# Patient Record
Sex: FEMALE | Race: BLACK OR AFRICAN AMERICAN | HISPANIC OR LATINO
[De-identification: names, ages, dates, MRNs, and addresses within clinical notes are randomized per-mention and may not be internally consistent; named-entity substitution may affect disease eponyms.]

---

## 2021-01-25 PROBLEM — Z00.00 ENCOUNTER FOR PREVENTIVE HEALTH EXAMINATION: Status: ACTIVE | Noted: 2021-01-25

## 2021-02-09 ENCOUNTER — APPOINTMENT (OUTPATIENT)
Dept: NEUROLOGY | Facility: CLINIC | Age: 76
End: 2021-02-09

## 2021-04-06 ENCOUNTER — APPOINTMENT (OUTPATIENT)
Dept: NEUROLOGY | Facility: CLINIC | Age: 76
End: 2021-04-06
Payer: MEDICARE

## 2021-04-06 VITALS
BODY MASS INDEX: 17.72 KG/M2 | HEIGHT: 63 IN | HEART RATE: 77 BPM | SYSTOLIC BLOOD PRESSURE: 122 MMHG | WEIGHT: 100 LBS | DIASTOLIC BLOOD PRESSURE: 74 MMHG | OXYGEN SATURATION: 98 % | TEMPERATURE: 97.9 F

## 2021-04-06 DIAGNOSIS — R73.03 PREDIABETES.: ICD-10-CM

## 2021-04-06 DIAGNOSIS — Z82.49 FAMILY HISTORY OF ISCHEMIC HEART DISEASE AND OTHER DISEASES OF THE CIRCULATORY SYSTEM: ICD-10-CM

## 2021-04-06 DIAGNOSIS — Z78.9 OTHER SPECIFIED HEALTH STATUS: ICD-10-CM

## 2021-04-06 DIAGNOSIS — Z86.73 PERSONAL HISTORY OF TRANSIENT ISCHEMIC ATTACK (TIA), AND CEREBRAL INFARCTION W/OUT RESIDUAL DEFICITS: ICD-10-CM

## 2021-04-06 DIAGNOSIS — Z83.3 FAMILY HISTORY OF DIABETES MELLITUS: ICD-10-CM

## 2021-04-06 DIAGNOSIS — E78.5 HYPERLIPIDEMIA, UNSPECIFIED: ICD-10-CM

## 2021-04-06 DIAGNOSIS — I10 ESSENTIAL (PRIMARY) HYPERTENSION: ICD-10-CM

## 2021-04-06 PROCEDURE — 99205 OFFICE O/P NEW HI 60 MIN: CPT

## 2021-04-06 RX ORDER — NIFEDIPINE 90 MG
90 TABLET, EXTENDED RELEASE ORAL
Refills: 0 | Status: ACTIVE | COMMUNITY

## 2021-04-06 RX ORDER — ATENOLOL 25 MG/1
25 TABLET ORAL
Refills: 0 | Status: ACTIVE | COMMUNITY

## 2021-04-06 RX ORDER — PSYLLIUM HUSK 3.4 G/5.8G
58.12 POWDER ORAL
Refills: 0 | Status: ACTIVE | COMMUNITY

## 2021-04-06 RX ORDER — APIXABAN 5 MG/1
5 TABLET, FILM COATED ORAL
Refills: 0 | Status: ACTIVE | COMMUNITY

## 2021-04-06 NOTE — ASSESSMENT
[FreeTextEntry1] : The patient has had a stroke, possibly hemorrhagic but on AC, prior records not available\par daughter will request and bring hospitalization records\par will get CTH and LDL/Hba1C\par will cw statin at this time\par patient on AC\par \par Anxiety \par will refer to psych

## 2021-04-06 NOTE — PHYSICAL EXAM
[General Appearance - Alert] : alert [General Appearance - In No Acute Distress] : in no acute distress [Person] : oriented to person [Place] : oriented to place [Time] : oriented to time [Concentration Intact] : normal concentrating ability [Naming Objects] : no difficulty naming common objects [Fluency] : fluency intact [Comprehension] : comprehension intact [Vocabulary] : adequate range of vocabulary [Cranial Nerves Optic (II)] : visual acuity intact bilaterally,  visual fields full to confrontation, pupils equal round and reactive to light [Cranial Nerves Oculomotor (III)] : extraocular motion intact [Cranial Nerves Trigeminal (V)] : facial sensation intact symmetrically [Cranial Nerves Facial Central - Left Only] : central 7th nerve weakness [Sensation Tactile Decrease] : light touch was intact [1+] : Ankle jerk right 1+ [2+] : Ankle jerk left 2+ [Cranial Nerves Facial Central - Right Only] : no central 7th nerve weakness [Coordination - Dysmetria Impaired Finger-to-Nose Bilateral] : not present [Coordination - Dysmetria Impaired Heel-to-Shin Bilateral] : not present [FreeTextEntry6] : 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

## 2021-04-06 NOTE — CONSULT LETTER
[Dear  ___] : Dear  [unfilled], [Consult Letter:] : I had the pleasure of evaluating your patient, [unfilled]. [Please see my note below.] : Please see my note below. [Consult Closing:] : Thank you very much for allowing me to participate in the care of this patient.  If you have any questions, please do not hesitate to contact me. [Sincerely,] : Sincerely, [FreeTextEntry3] : Michelle Schneider MD, MPH\par

## 2021-04-06 NOTE — HISTORY OF PRESENT ILLNESS
[FreeTextEntry1] : The patient is here for stroke fu.  She had a stroke summer 2020, started with dizziness and then had left sided weakness.  Has had bleed in the brain as per the daughter.  Has had trouble with memory, mood and attention since then.\par \par Was in [facility] and then transferred to rehab.

## 2021-04-07 LAB
CHOLEST SERPL-MCNC: 135 MG/DL
ESTIMATED AVERAGE GLUCOSE: 105 MG/DL
HBA1C MFR BLD HPLC: 5.3 %
HDLC SERPL-MCNC: 69 MG/DL
LDLC SERPL CALC-MCNC: 56 MG/DL
NONHDLC SERPL-MCNC: 66 MG/DL
TRIGL SERPL-MCNC: 49 MG/DL

## 2021-06-07 ENCOUNTER — APPOINTMENT (OUTPATIENT)
Dept: NEUROLOGY | Facility: CLINIC | Age: 76
End: 2021-06-07

## 2021-06-16 ENCOUNTER — APPOINTMENT (OUTPATIENT)
Dept: NEUROLOGY | Facility: CLINIC | Age: 76
End: 2021-06-16

## 2021-07-27 ENCOUNTER — APPOINTMENT (OUTPATIENT)
Dept: NEUROLOGY | Facility: CLINIC | Age: 76
End: 2021-07-27
Payer: MEDICARE

## 2021-07-27 VITALS
DIASTOLIC BLOOD PRESSURE: 71 MMHG | WEIGHT: 99 LBS | BODY MASS INDEX: 17.54 KG/M2 | OXYGEN SATURATION: 99 % | SYSTOLIC BLOOD PRESSURE: 117 MMHG | HEIGHT: 63 IN | HEART RATE: 60 BPM | TEMPERATURE: 98.4 F

## 2021-07-27 DIAGNOSIS — F01.50 VASCULAR DEMENTIA W/OUT BEHAVIORAL DISTURBANCE: ICD-10-CM

## 2021-07-27 DIAGNOSIS — F41.9 ANXIETY DISORDER, UNSPECIFIED: ICD-10-CM

## 2021-07-27 DIAGNOSIS — I63.9 CEREBRAL INFARCTION, UNSPECIFIED: ICD-10-CM

## 2021-07-27 PROCEDURE — 99214 OFFICE O/P EST MOD 30 MIN: CPT

## 2021-07-27 RX ORDER — DONEPEZIL HYDROCHLORIDE 5 MG/1
5 TABLET ORAL
Qty: 60 | Refills: 5 | Status: ACTIVE | COMMUNITY
Start: 2021-07-27 | End: 1900-01-01

## 2021-07-27 NOTE — HISTORY OF PRESENT ILLNESS
[FreeTextEntry1] : The patient is here for stroke fu. She had a stroke summer 2020, started with dizziness and then had left sided weakness. Has had bleed in the brain as per the daughter. Has had trouble with memory, mood and attention since then.\par \par Was in [facility] and then transferred to rehab. \par Since last visit, the patient had another episode of left facial weakness with slurred speech which lasted for a few minutes, the patient was seen in [facility], her symptoms have resolved.  She had an unremarkable MRI of the brain,   As per daughter's report.  The patient cholesterol is medication was increased from 20 to 80 mg.\par

## 2021-07-27 NOTE — ASSESSMENT
[FreeTextEntry1] : The patient has had a right MCA stroke and likely has vascular dementia.  She had recent episodes of transient neurological symptoms consistent with TIA, daughter will request and bring hospitalization records.  LDL and hemoglobin A1c at goal, less than 100.  For secondary stroke prevention we will continue wide anticoagulation and will decrease atorvastatin from 80 to 40 mg, as LDL is 56.\par \par Vascular dementia, will add Aricept 5 mg and titrate to 10 mg in 4 to 6 weeks if no side effects to the medication.\par \par Anxiety \par The patient has evaluation with psychiatrist next week.\par \par I spent the time noted on the day of this patient encounter preparing for, providing and documenting the above E/M service and counseling and educate patient on differential, workup, disease course, and treatment/management. Education was provided to the patient during this encounter. All questions and concerns were answered and addressed in detail. The patient verbalized understanding and agreed to plan. Patient was advised to continue to monitor for neurologic symptoms and to notify my office or go to the nearest emergency room if there are any changes. Any orders/referrals and communications were provided as well. \par Side effects of the above medications were discussed in detail including but not limited to applicable black box warning and teratogenicity as appropriate. \par Patient was advised to bring previous records to my office, including CD of imaging, when applicable. \par \par

## 2021-07-27 NOTE — PHYSICAL EXAM
[General Appearance - Alert] : alert [General Appearance - In No Acute Distress] : in no acute distress [Person] : oriented to person [Place] : disoriented to place [Time] : disoriented to time [Concentration Intact] : normal concentrating ability [Naming Objects] : no difficulty naming common objects [Fluency] : fluency not intact [Vocabulary] : adequate range of vocabulary [Cranial Nerves Optic (II)] : visual acuity intact bilaterally,  visual fields full to confrontation, pupils equal round and reactive to light [Cranial Nerves Oculomotor (III)] : extraocular motion intact [Cranial Nerves Trigeminal (V)] : facial sensation intact symmetrically [Cranial Nerves Facial Central - Right Only] : no central 7th nerve weakness [Cranial Nerves Facial Central - Left Only] : central 7th nerve weakness [FreeTextEntry6] : Left-sided hemiplegia

## 2021-10-29 ENCOUNTER — APPOINTMENT (OUTPATIENT)
Dept: NEUROLOGY | Facility: CLINIC | Age: 76
End: 2021-10-29

## 2022-01-11 ENCOUNTER — RX RENEWAL (OUTPATIENT)
Age: 77
End: 2022-01-11

## 2022-01-18 ENCOUNTER — RX RENEWAL (OUTPATIENT)
Age: 77
End: 2022-01-18

## 2022-03-15 ENCOUNTER — APPOINTMENT (OUTPATIENT)
Dept: NEUROLOGY | Facility: CLINIC | Age: 77
End: 2022-03-15

## 2022-06-27 ENCOUNTER — RX RENEWAL (OUTPATIENT)
Age: 77
End: 2022-06-27

## 2022-06-27 RX ORDER — DONEPEZIL HYDROCHLORIDE 10 MG/1
10 TABLET ORAL DAILY
Qty: 30 | Refills: 2 | Status: ACTIVE | COMMUNITY
Start: 2022-06-27 | End: 1900-01-01

## 2022-06-27 RX ORDER — ATORVASTATIN CALCIUM 40 MG/1
40 TABLET, FILM COATED ORAL
Qty: 90 | Refills: 1 | Status: ACTIVE | COMMUNITY
Start: 2022-01-11 | End: 1900-01-01